# Patient Record
Sex: FEMALE | Race: WHITE | URBAN - METROPOLITAN AREA
[De-identification: names, ages, dates, MRNs, and addresses within clinical notes are randomized per-mention and may not be internally consistent; named-entity substitution may affect disease eponyms.]

---

## 2017-07-29 ENCOUNTER — EMERGENCY (EMERGENCY)
Facility: HOSPITAL | Age: 37
LOS: 1 days | Discharge: HOME | End: 2017-07-29
Admitting: INTERNAL MEDICINE

## 2017-07-29 DIAGNOSIS — S60.414A ABRASION OF RIGHT RING FINGER, INITIAL ENCOUNTER: ICD-10-CM

## 2017-07-29 DIAGNOSIS — S60.412A ABRASION OF RIGHT MIDDLE FINGER, INITIAL ENCOUNTER: ICD-10-CM

## 2017-07-29 DIAGNOSIS — L03.011 CELLULITIS OF RIGHT FINGER: ICD-10-CM
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
## 2017-07-29 DIAGNOSIS — Z90.49 ACQUIRED ABSENCE OF OTHER SPECIFIED PARTS OF DIGESTIVE TRACT: ICD-10-CM

## 2018-11-18 ENCOUNTER — FORM ENCOUNTER (OUTPATIENT)
Age: 38
End: 2018-11-18

## 2019-05-27 ENCOUNTER — TRANSCRIPTION ENCOUNTER (OUTPATIENT)
Age: 39
End: 2019-05-27

## 2019-08-06 ENCOUNTER — OUTPATIENT (OUTPATIENT)
Dept: OUTPATIENT SERVICES | Facility: HOSPITAL | Age: 39
LOS: 1 days | Discharge: HOME | End: 2019-08-06

## 2019-08-06 DIAGNOSIS — F41.8 OTHER SPECIFIED ANXIETY DISORDERS: ICD-10-CM

## 2019-08-06 DIAGNOSIS — R53.82 CHRONIC FATIGUE, UNSPECIFIED: ICD-10-CM

## 2019-08-06 DIAGNOSIS — Z00.01 ENCOUNTER FOR GENERAL ADULT MEDICAL EXAMINATION WITH ABNORMAL FINDINGS: ICD-10-CM

## 2019-10-17 PROBLEM — Z00.00 ENCOUNTER FOR PREVENTIVE HEALTH EXAMINATION: Status: ACTIVE | Noted: 2019-10-17

## 2019-10-23 ENCOUNTER — OTHER (OUTPATIENT)
Age: 39
End: 2019-10-23

## 2019-10-23 ENCOUNTER — APPOINTMENT (OUTPATIENT)
Dept: CARDIOLOGY | Facility: CLINIC | Age: 39
End: 2019-10-23
Payer: COMMERCIAL

## 2019-10-23 VITALS
HEIGHT: 66.5 IN | BODY MASS INDEX: 26.68 KG/M2 | HEART RATE: 93 BPM | WEIGHT: 168 LBS | SYSTOLIC BLOOD PRESSURE: 122 MMHG | DIASTOLIC BLOOD PRESSURE: 78 MMHG

## 2019-10-23 PROCEDURE — 99204 OFFICE O/P NEW MOD 45 MIN: CPT

## 2019-10-23 PROCEDURE — 93000 ELECTROCARDIOGRAM COMPLETE: CPT

## 2019-10-23 RX ORDER — LORAZEPAM 0.5 MG/1
0.5 TABLET ORAL
Refills: 0 | Status: ACTIVE | COMMUNITY

## 2019-10-23 RX ORDER — ESTRADIOL 10 UG/1
TABLET, FILM COATED VAGINAL
Refills: 0 | Status: ACTIVE | COMMUNITY

## 2019-10-23 NOTE — PHYSICAL EXAM
[General Appearance - Well Developed] : well developed [General Appearance - In No Acute Distress] : no acute distress [Normal Conjunctiva] : the conjunctiva exhibited no abnormalities [Eyelids - No Xanthelasma] : the eyelids demonstrated no xanthelasmas [Heart Rate And Rhythm] : heart rate and rhythm were normal [Heart Sounds] : normal S1 and S2 [Murmurs] : no murmurs present [Respiration, Rhythm And Depth] : normal respiratory rhythm and effort [Auscultation Breath Sounds / Voice Sounds] : lungs were clear to auscultation bilaterally [Exaggerated Use Of Accessory Muscles For Inspiration] : no accessory muscle use [Abdomen Soft] : soft [Abdomen Tenderness] : non-tender [Abdomen Mass (___ Cm)] : no abdominal mass palpated [Abnormal Walk] : normal gait [Gait - Sufficient For Exercise Testing] : the gait was sufficient for exercise testing [Nail Clubbing] : no clubbing of the fingernails [Cyanosis, Localized] : no localized cyanosis [Skin Color & Pigmentation] : normal skin color and pigmentation [] : no rash [No Skin Ulcers] : no skin ulcer [FreeTextEntry1] : kike WELDON

## 2019-10-23 NOTE — HISTORY OF PRESENT ILLNESS
[FreeTextEntry1] : 40 yo F with h/o Anxiety c/o frequent palpitations since August. Mainly at rest, at night, not on exertion, improved with deep breathing. Stressed over turning 39 in May. TSH is normal. Takes Ativan as needed. Switched to generic OCP approx. three months ago when symptoms atarted.\par \par FHx: Adopted\par \par

## 2019-11-06 ENCOUNTER — APPOINTMENT (OUTPATIENT)
Dept: CARDIOLOGY | Facility: CLINIC | Age: 39
End: 2019-11-06
Payer: COMMERCIAL

## 2019-11-06 PROCEDURE — 93224 XTRNL ECG REC UP TO 48 HRS: CPT

## 2019-11-07 ENCOUNTER — APPOINTMENT (OUTPATIENT)
Dept: CARDIOLOGY | Facility: CLINIC | Age: 39
End: 2019-11-07
Payer: COMMERCIAL

## 2019-11-07 DIAGNOSIS — R00.2 PALPITATIONS: ICD-10-CM

## 2019-11-07 PROCEDURE — 93306 TTE W/DOPPLER COMPLETE: CPT

## 2019-11-08 PROBLEM — R00.2 PALPITATIONS: Status: ACTIVE | Noted: 2019-10-23

## 2019-11-21 ENCOUNTER — OUTPATIENT (OUTPATIENT)
Dept: OUTPATIENT SERVICES | Facility: HOSPITAL | Age: 39
LOS: 1 days | Discharge: HOME | End: 2019-11-21

## 2019-11-21 ENCOUNTER — APPOINTMENT (OUTPATIENT)
Dept: OBGYN | Facility: CLINIC | Age: 39
End: 2019-11-21
Payer: COMMERCIAL

## 2019-11-21 ENCOUNTER — TRANSCRIPTION ENCOUNTER (OUTPATIENT)
Age: 39
End: 2019-11-21

## 2019-11-21 VITALS
HEIGHT: 66.5 IN | HEART RATE: 89 BPM | DIASTOLIC BLOOD PRESSURE: 87 MMHG | SYSTOLIC BLOOD PRESSURE: 128 MMHG | BODY MASS INDEX: 26.68 KG/M2 | WEIGHT: 168 LBS

## 2019-11-21 DIAGNOSIS — Z87.19 PERSONAL HISTORY OF OTHER DISEASES OF THE DIGESTIVE SYSTEM: ICD-10-CM

## 2019-11-21 DIAGNOSIS — Z78.9 OTHER SPECIFIED HEALTH STATUS: ICD-10-CM

## 2019-11-21 DIAGNOSIS — Z30.41 ENCOUNTER FOR SURVEILLANCE OF CONTRACEPTIVE PILLS: ICD-10-CM

## 2019-11-21 PROCEDURE — 99396 PREV VISIT EST AGE 40-64: CPT

## 2019-11-21 RX ORDER — DROSPIRENONE AND ETHINYL ESTRADIOL 0.02-3(28)
3-0.02 KIT ORAL
Refills: 0 | Status: ACTIVE | COMMUNITY

## 2019-11-21 NOTE — CHIEF COMPLAINT
[Annual Visit] : annual visit [FreeTextEntry1] : Patient presents for annual GYN exam. Last Pap 11/2018 normal. LMP 10/24/2019.  Patient is currently on oral contraception. Patient is due for first mammogram.

## 2019-11-21 NOTE — PHYSICAL EXAM
[Awake] : awake [Alert] : alert [Acute Distress] : no acute distress [Mass] : no breast mass [Nipple Discharge] : no nipple discharge [Soft] : soft [Tender] : non tender [Distended] : not distended [Oriented x3] : oriented to person, place, and time [Normal] : uterus [Labia Majora] : labia major [Labia Minora] : labia minora [Uterine Adnexae] : were not tender and not enlarged

## 2019-11-21 NOTE — DISCUSSION/SUMMARY
[FreeTextEntry1] : Patient here for annual exam\par Doing well on oral contraceptive\par Nikki Loaiza M.D.\par

## 2019-11-21 NOTE — HISTORY OF PRESENT ILLNESS
[Definite:  ___ (Date)] : the last menstrual period was [unfilled] [Menarche Age: ____] : age at menarche was [unfilled] [Sexually Active] : is sexually active [Contraception] : uses contraception [Oral Contraceptives] : uses oral contraceptives [Male ___] : [unfilled] male [No] : no

## 2019-11-22 DIAGNOSIS — Z01.419 ENCOUNTER FOR GYNECOLOGICAL EXAMINATION (GENERAL) (ROUTINE) WITHOUT ABNORMAL FINDINGS: ICD-10-CM

## 2019-11-22 DIAGNOSIS — Z30.9 ENCOUNTER FOR CONTRACEPTIVE MANAGEMENT, UNSPECIFIED: ICD-10-CM

## 2019-11-27 ENCOUNTER — RECORD ABSTRACTING (OUTPATIENT)
Age: 39
End: 2019-11-27

## 2019-11-27 DIAGNOSIS — Z78.9 OTHER SPECIFIED HEALTH STATUS: ICD-10-CM

## 2019-11-27 LAB — CYTOLOGY CVX/VAG DOC THIN PREP: NORMAL

## 2019-12-02 LAB — HPV HIGH+LOW RISK DNA PNL CVX: NOT DETECTED

## 2019-12-04 ENCOUNTER — OUTPATIENT (OUTPATIENT)
Dept: OUTPATIENT SERVICES | Facility: HOSPITAL | Age: 39
LOS: 1 days | Discharge: HOME | End: 2019-12-04
Payer: COMMERCIAL

## 2019-12-04 DIAGNOSIS — Z12.31 ENCOUNTER FOR SCREENING MAMMOGRAM FOR MALIGNANT NEOPLASM OF BREAST: ICD-10-CM

## 2019-12-04 PROCEDURE — 77067 SCR MAMMO BI INCL CAD: CPT | Mod: 26

## 2019-12-04 PROCEDURE — 77063 BREAST TOMOSYNTHESIS BI: CPT | Mod: 26

## 2019-12-05 ENCOUNTER — RESULT REVIEW (OUTPATIENT)
Age: 39
End: 2019-12-05

## 2019-12-17 ENCOUNTER — OUTPATIENT (OUTPATIENT)
Dept: OUTPATIENT SERVICES | Facility: HOSPITAL | Age: 39
LOS: 1 days | Discharge: HOME | End: 2019-12-17
Payer: COMMERCIAL

## 2019-12-17 DIAGNOSIS — R92.8 OTHER ABNORMAL AND INCONCLUSIVE FINDINGS ON DIAGNOSTIC IMAGING OF BREAST: ICD-10-CM

## 2019-12-17 PROCEDURE — G0279: CPT | Mod: 26,LT

## 2019-12-17 PROCEDURE — 77065 DX MAMMO INCL CAD UNI: CPT | Mod: 26,LT

## 2019-12-17 PROCEDURE — 76641 ULTRASOUND BREAST COMPLETE: CPT | Mod: 26,50

## 2019-12-19 ENCOUNTER — OTHER (OUTPATIENT)
Age: 39
End: 2019-12-19

## 2019-12-19 DIAGNOSIS — R92.8 OTHER ABNORMAL AND INCONCLUSIVE FINDINGS ON DIAGNOSTIC IMAGING OF BREAST: ICD-10-CM

## 2019-12-19 DIAGNOSIS — Z87.898 PERSONAL HISTORY OF OTHER SPECIFIED CONDITIONS: ICD-10-CM

## 2020-01-05 ENCOUNTER — TRANSCRIPTION ENCOUNTER (OUTPATIENT)
Age: 40
End: 2020-01-05

## 2020-06-17 ENCOUNTER — RESULT REVIEW (OUTPATIENT)
Age: 40
End: 2020-06-17

## 2020-06-17 ENCOUNTER — OUTPATIENT (OUTPATIENT)
Dept: OUTPATIENT SERVICES | Facility: HOSPITAL | Age: 40
LOS: 1 days | Discharge: HOME | End: 2020-06-17
Payer: COMMERCIAL

## 2020-06-17 DIAGNOSIS — R92.8 OTHER ABNORMAL AND INCONCLUSIVE FINDINGS ON DIAGNOSTIC IMAGING OF BREAST: ICD-10-CM

## 2020-06-17 PROCEDURE — 76642 ULTRASOUND BREAST LIMITED: CPT | Mod: 26,LT

## 2020-08-02 ENCOUNTER — TRANSCRIPTION ENCOUNTER (OUTPATIENT)
Age: 40
End: 2020-08-02

## 2020-09-12 ENCOUNTER — TRANSCRIPTION ENCOUNTER (OUTPATIENT)
Age: 40
End: 2020-09-12

## 2020-12-18 ENCOUNTER — RESULT REVIEW (OUTPATIENT)
Age: 40
End: 2020-12-18

## 2020-12-18 ENCOUNTER — OUTPATIENT (OUTPATIENT)
Dept: OUTPATIENT SERVICES | Facility: HOSPITAL | Age: 40
LOS: 1 days | Discharge: HOME | End: 2020-12-18
Payer: COMMERCIAL

## 2020-12-18 DIAGNOSIS — R92.8 OTHER ABNORMAL AND INCONCLUSIVE FINDINGS ON DIAGNOSTIC IMAGING OF BREAST: ICD-10-CM

## 2020-12-18 PROCEDURE — 77066 DX MAMMO INCL CAD BI: CPT | Mod: 26

## 2020-12-18 PROCEDURE — 76642 ULTRASOUND BREAST LIMITED: CPT | Mod: 26,LT

## 2020-12-18 PROCEDURE — G0279: CPT | Mod: 26

## 2021-01-14 ENCOUNTER — APPOINTMENT (OUTPATIENT)
Dept: OBGYN | Facility: CLINIC | Age: 41
End: 2021-01-14
Payer: COMMERCIAL

## 2021-01-14 VITALS
BODY MASS INDEX: 27.64 KG/M2 | TEMPERATURE: 97.8 F | WEIGHT: 174 LBS | HEART RATE: 71 BPM | DIASTOLIC BLOOD PRESSURE: 70 MMHG | SYSTOLIC BLOOD PRESSURE: 114 MMHG | HEIGHT: 66.5 IN

## 2021-01-14 DIAGNOSIS — Z30.9 ENCOUNTER FOR CONTRACEPTIVE MANAGEMENT, UNSPECIFIED: ICD-10-CM

## 2021-01-14 DIAGNOSIS — Z01.419 ENCOUNTER FOR GYNECOLOGICAL EXAMINATION (GENERAL) (ROUTINE) W/OUT ABNORMAL FINDINGS: ICD-10-CM

## 2021-01-14 LAB
HCG UR QL: NEGATIVE
QUALITY CONTROL: YES

## 2021-01-14 PROCEDURE — 99396 PREV VISIT EST AGE 40-64: CPT | Mod: 25

## 2021-01-14 PROCEDURE — 81025 URINE PREGNANCY TEST: CPT

## 2021-01-14 PROCEDURE — 99072 ADDL SUPL MATRL&STAF TM PHE: CPT

## 2021-01-14 RX ORDER — DROSPIRENONE AND ETHINYL ESTRADIOL 0.02-3(28)
3-0.02 KIT ORAL DAILY
Qty: 3 | Refills: 3 | Status: ACTIVE | COMMUNITY
Start: 2019-11-21 | End: 1900-01-01

## 2021-01-14 NOTE — HISTORY OF PRESENT ILLNESS
[Menarche Age: ____] : age at menarche was [unfilled] [No] : Patient does not have concerns regarding sex [Currently Active] : currently active [Men] : men [FreeTextEntry1] : 01-

## 2021-01-14 NOTE — PHYSICAL EXAM
[Appropriately responsive] : appropriately responsive [Alert] : alert [No Acute Distress] : no acute distress [No Lymphadenopathy] : no lymphadenopathy [No Murmurs] : no murmurs [Soft] : soft [Non-tender] : non-tender [Non-distended] : non-distended [No Lesions] : no lesions [No HSM] : No HSM [No Mass] : no mass [Oriented x3] : oriented x3 [Examination Of The Breasts] : a normal appearance [No Masses] : no breast masses were palpable [Labia Majora] : normal [Labia Minora] : normal [Normal] : normal [Uterine Adnexae] : normal

## 2021-01-20 LAB
CYTOLOGY CVX/VAG DOC THIN PREP: NORMAL
HPV HIGH+LOW RISK DNA PNL CVX: NOT DETECTED

## 2021-03-18 ENCOUNTER — APPOINTMENT (OUTPATIENT)
Dept: BREAST CENTER | Facility: CLINIC | Age: 41
End: 2021-03-18
Payer: COMMERCIAL

## 2021-03-18 VITALS
HEIGHT: 66 IN | TEMPERATURE: 98.2 F | SYSTOLIC BLOOD PRESSURE: 126 MMHG | BODY MASS INDEX: 27.64 KG/M2 | WEIGHT: 172 LBS | DIASTOLIC BLOOD PRESSURE: 80 MMHG

## 2021-03-18 DIAGNOSIS — Z87.891 PERSONAL HISTORY OF NICOTINE DEPENDENCE: ICD-10-CM

## 2021-03-18 PROCEDURE — 99072 ADDL SUPL MATRL&STAF TM PHE: CPT

## 2021-03-18 PROCEDURE — 99203 OFFICE O/P NEW LOW 30 MIN: CPT

## 2021-03-18 NOTE — REVIEW OF SYSTEMS
[As Noted in HPI] : as noted in HPI [Breast Pain] : breast pain [Negative] : Heme/Lymph [Abn Vaginal Bleeding] : no unexplained vaginal bleeding [Skin Lesions] : no skin lesions [Skin Wound] : no skin wound [Breast Lump] : no breast lump

## 2021-03-18 NOTE — PHYSICAL EXAM
[Normocephalic] : normocephalic [Atraumatic] : atraumatic [EOMI] : extra ocular movement intact [No Supraclavicular Adenopathy] : no supraclavicular adenopathy [No Cervical Adenopathy] : no cervical adenopathy [No dominant masses] : no dominant masses in right breast  [No dominant masses] : no dominant masses left breast [No Nipple Retraction] : no left nipple retraction [No Nipple Discharge] : no left nipple discharge [No Axillary Lymphadenopathy] : no left axillary lymphadenopathy [Soft] : abdomen soft [Not Tender] : non-tender [No Edema] : no edema [No Rashes] : no rashes [No Ulceration] : no ulceration [Examined in the supine and seated position] : examined in the supine and seated position [de-identified] : multiple bilateral nondiscrete nodularities palpated throughout both breasts, but no suspicious or discrete mass

## 2021-03-18 NOTE — ASSESSMENT
[FreeTextEntry1] : Eladio is a 40F with  L breast BIRADS 3 abnormality. \par \par On exam, she had b/l nondiscrete nodularities but no suspicious masses or abnormality was palpated within either breast. \par \par Her most recent imaging was a b/l screening mammogram and L breast US which revealed a stable L breast mass @11-12N2, measurig 1.2 x 1.7 x 0.9 cm, deemed BIRADS 3.  She had another mass in her left breast, measuring 0.3 cm, however this was not visualized on this US. \par \par She will be due for a L dx mammogram and US On 6/18/2021.  This will be scheduled for her today.  I will have her follow up after for a CBE. \par \par We discussed BIRADS 3 lesions.  These lesions have a 2% chance of harboring malignancy.  There is always an option to obtain a tissue sample with a biopsy to confirm the diagnosis.   The procedure was described in detail including the placement of a tissue marker clip.  The risks of the procedure, including but not limited to bleeding and infection were also explained.  She is not interested in biopsy at this time and agrees to continue with short-term interval imaging.\par \par We discussed dense breasts.  Increasing breast density has been found to increase ones risk of breast cancer, but at this time, there is no clear indication for additional imaging in this setting, as both US and MRI have not been found to improve survival.  One can consider bilateral screening US.  However, out of 1000 women screened, the use of routine US will only identify an additional 3-5 cancers.  The use of US was found to increase the likelihood of undergoing more imaging and more biopsies.  She does have dense breasts.  We have decided to proceed with screening bilateral breast US at this time.  This will be scheduled with her next screening mammogram.\par \par She is otherwise at an average risk for breast cancer and should continue with annual screening mammograms/US. \par \par All of her questions were answered.  She knows to call with any further questions or concerns. \par \par \par PLAN: \par -L dx mammogram and US on 6/18/2021 \par -f/up after

## 2021-03-18 NOTE — HISTORY OF PRESENT ILLNESS
[FreeTextEntry1] : Kaitlin is a 40 F with L breast BIRADS 3 abnormality. \par \par She has bilateral cyclical breast pain, but has not palpated any abnormal masses within either breast and denies any nipple discharge or retraction.  Her left breast is larger than her right and has been that way since her third child. \par \par Her work up was as follows: \par 2020 -- b/l screening mammogram and L breast US \par -heterogenously dense breasts\par -re-demonstration of unchanged L breast mass \par -no suspicious microcalcifications or architectural distortion in L \par -no suspicious mass, microcalcifications or architectural distortion in R \par L US \par -@11-12N2, 1.2 x 1.7 x 0.9 cm circumscribed hypoechoic mass, no change \par -previously described less distinct mass measuring 0.3 cm is not visualized on current exam\par BIRADS 3 \par \par HISTORICAL RISK FACTORS: \par -no prior breast biopsies or surgeries \par -no family history of breast or ovarian cancer \par -, age at first live birth was 24 \par -current OCP use x 7 years\par -no gyn surgeries\par

## 2021-03-18 NOTE — PAST MEDICAL HISTORY
[Menstruating] : The patient is menstruating [Menarche Age ____] : age at menarche was [unfilled] [Total Preg ___] : G[unfilled] [Live Births ___] : P[unfilled]  [Age At Live Birth ___] : Age at live birth: [unfilled] [History of Hormone Replacement Treatment] : has no history of hormone replacement treatment [FreeTextEntry5] : denies [FreeTextEntry6] : denies [FreeTextEntry7] : yes in past  [FreeTextEntry8] : denies

## 2021-03-18 NOTE — DATA REVIEWED
[FreeTextEntry1] : EXAM: MG US BREAST LIMITED LT\par EXAM: MG MAMMO DIAG W TATI BI#\par \par \par PROCEDURE DATE: 12/18/2020\par \par \par \par INTERPRETATION: Patient presents for follow-up of masses within the left breast since 12/17/2019. Screening right breast.\par \par The patient reports her last clinical breast examination was performed 11 months ago.\par \par CC and MLO views of the bilateral breasts in addition to spot compression views of the right breast in CC and MLO projections were obtained. 3D tomosynthesis images were obtained as well.\par \par Computer-aided detection was utilized in the interpretation of this examination.\par \par Comparison is made to the prior examinations dating back to 12/4/2019.\par \par Breast composition: The breasts are heterogeneously dense, which may obscure small masses.\par \par There is redemonstration of unchanged left breast masses. Short interval follow-up recommended. No new suspicious calcification or architectural distortion in the left breast.\par \par There is no suspicious mass, calcification or architectural distortion in the right breast.\par \par Targeted left breast ultrasound:\par \par At the 11-12 o'clock position 2 cm from nipple, 1.2 x 1.7 x 0.9 cm circumscribed hypoechoic mass is without significant change. Short interval follow-up recommended. A previously described less distinct 0.3 cm mass in the same area is not visualized on the current examination likely in the basal for fluctuating cyst.\par \par Impression:\par 1. Stable mass left breast, probably benign. Short interval follow-up recommended.\par \par 2. No mammographic malignancy in the right breast, continue annual screening right breast.\par \par Recommendation: Follow-up unilateral diagnostic mammogram and ultrasound in 6 months.\par \par BI-RADS category 3: Probably Benign\par \par \par \par \par ANGIE KRAUSE M.D., RESIDENT RADIOLOGIST\par This document has been electronically signed.\par SAADIA LUGO DO; Attending Radiologist\par This document has been electronically signed. Dec 18 2020 11:46AM\par \par

## 2021-06-22 ENCOUNTER — RESULT REVIEW (OUTPATIENT)
Age: 41
End: 2021-06-22

## 2021-06-22 ENCOUNTER — NON-APPOINTMENT (OUTPATIENT)
Age: 41
End: 2021-06-22

## 2021-06-22 ENCOUNTER — OUTPATIENT (OUTPATIENT)
Dept: OUTPATIENT SERVICES | Facility: HOSPITAL | Age: 41
LOS: 1 days | Discharge: HOME | End: 2021-06-22
Payer: COMMERCIAL

## 2021-06-22 DIAGNOSIS — R92.8 OTHER ABNORMAL AND INCONCLUSIVE FINDINGS ON DIAGNOSTIC IMAGING OF BREAST: ICD-10-CM

## 2021-06-22 PROCEDURE — 76642 ULTRASOUND BREAST LIMITED: CPT | Mod: 26,LT

## 2021-06-22 PROCEDURE — 77065 DX MAMMO INCL CAD UNI: CPT | Mod: 26,LT

## 2021-06-22 PROCEDURE — G0279: CPT | Mod: 26

## 2021-06-23 ENCOUNTER — NON-APPOINTMENT (OUTPATIENT)
Age: 41
End: 2021-06-23

## 2021-06-30 ENCOUNTER — APPOINTMENT (OUTPATIENT)
Dept: BREAST CENTER | Facility: CLINIC | Age: 41
End: 2021-06-30
Payer: COMMERCIAL

## 2021-06-30 VITALS
TEMPERATURE: 98.7 F | SYSTOLIC BLOOD PRESSURE: 122 MMHG | DIASTOLIC BLOOD PRESSURE: 78 MMHG | WEIGHT: 170 LBS | HEIGHT: 66 IN | BODY MASS INDEX: 27.32 KG/M2

## 2021-06-30 PROCEDURE — 99213 OFFICE O/P EST LOW 20 MIN: CPT

## 2021-06-30 PROCEDURE — 99072 ADDL SUPL MATRL&STAF TM PHE: CPT

## 2021-07-03 NOTE — DATA REVIEWED
[FreeTextEntry1] : EXAM:  MG US BREAST LIMITED LT\par EXAM:  MG MAMMO DIAG W TATI LT#\par \par \par PROCEDURE DATE:  06/22/2021\par \par \par \par INTERPRETATION:  CLINICAL HISTORY: Short interval follow-up for probably benign left breast masses, first identified in December, 2019.\par \par The patient reports her last clinical breast examination was performed 3 months ago.\par \par FAMILY HISTORY: Unknown.\par \par COMPARISONS: Mammograms/ultrasounds dating back to 2019.\par \par BREAST COMPOSITION: The breasts are heterogeneously dense, which may obscure small masses.\par \par VIEWS: 2-D/tomosynthesis CC/MLO views of the left breast, spot view of the left breast. Computer-aided detection was utilized by the radiologists in the interpretation of this examination.\par \par \par FINDINGS:\par \par MAMMOGRAM:\par Stable circumscribed masses in the left breast are unchanged from 12/4/2019 and likely benign. Stable asymmetry is seen in the left lateral breast. No suspicious masses, calcifications, or areas of architectural distortion are seen. There has been no significant interval change from the prior study.\par \par ULTRASOUND:\par Targeted left breast ultrasound was performed.\par \par Stable, circumscribed, hypoechoic mass in the left breast, 11-12 o'clock axis, 2 cm the nipple measures up to 1.7 cm, stable from 12/17/2019 and likely benign. Continued follow-up is recommended to demonstrate stability. No suspicious masses are seen in the left lateral breast.\par \par \par IMPRESSION:\par \par Stable probably benign left breast mass as above.\par \par Recommendation: Follow-up bilateral diagnostic mammogram and ultrasound in 6 months.\par \par BI-RADS category 3: Probably Benign\par \par Findings and recommendations were discussed with the patient.\par \par \par \par \par \par ZAY MELENDEZ MD; Attending Radiologist\par This document has been electronically signed. Jun 22 2021  1:01PM\par \par  \par

## 2021-07-03 NOTE — HISTORY OF PRESENT ILLNESS
[FreeTextEntry1] : Kaitlin is a 41 F with L breast BIRADS 3 abnormality.\par \par She has bilateral cyclical breast pain, but has not palpated any abnormal masses within either breast and denies any nipple discharge or retraction.  Her left breast is larger than her right and has been that way since her third child. \par \par Her work up was as follows: \par 2020 -- b/l screening mammogram and L breast US \par -heterogenously dense breasts\par -re-demonstration of unchanged L breast mass \par -no suspicious microcalcifications or architectural distortion in L \par -no suspicious mass, microcalcifications or architectural distortion in R \par L US \par -@11-12N2, 1.2 x 1.7 x 0.9 cm circumscribed hypoechoic mass, no change \par -previously described less distinct mass measuring 0.3 cm is not visualized on current exam\par BIRADS 3 \par \par HISTORICAL RISK FACTORS: \par -no prior breast biopsies or surgeries \par -no family history of breast or ovarian cancer \par -, age at first live birth was 24 \par -current OCP use x 7 years\par -no gyn surgeries\par \par INTERVAL HISTORY: \par Kaitlin returns for a short term follow up for a L breast BIRADS 3 mass. \par \par She has no breast related complaints at this time.  She denies any breast pain, has not palpated any new palpable masses in either breast and denies any nipple discharge or retraction. \par \par Her most recent imaging was performed on 2021, a L dx mammogram and US, which revealed a stable L breast mass @11-12N2, measuring 1.7 cm, stable left lateral breast asymmetry, and otherwise unrevealing, deemed BIRADS 3.

## 2021-07-03 NOTE — PHYSICAL EXAM
[Normocephalic] : normocephalic [Atraumatic] : atraumatic [EOMI] : extra ocular movement intact [No Supraclavicular Adenopathy] : no supraclavicular adenopathy [No Cervical Adenopathy] : no cervical adenopathy [Examined in the supine and seated position] : examined in the supine and seated position [No dominant masses] : no dominant masses in right breast  [No dominant masses] : no dominant masses left breast [No Nipple Retraction] : no left nipple retraction [No Nipple Discharge] : no left nipple discharge [No Axillary Lymphadenopathy] : no left axillary lymphadenopathy [Soft] : abdomen soft [Not Tender] : non-tender [No Edema] : no edema [No Rashes] : no rashes [No Ulceration] : no ulceration [de-identified] : multiple bilateral nondiscrete nodularities palpated throughout both breasts, but no suspicious or discrete mass

## 2021-07-03 NOTE — ASSESSMENT
[FreeTextEntry1] : Eladio is a 41F with  L breast BIRADS 3 abnormality. \par \par On exam, she had b/l nondiscrete nodularities but no suspicious masses or abnormality was palpated within either breast. \par \par Her most recent imaging was performed on 6/30/2021, a L dx mammogram and US, which revealed a stable L breast mass @11-12N2, measuring 1.7 cm, stable left lateral breast asymmetry, and otherwise unrevealing, deemed BIRADS 3. \par \par She will be due for a b/l dx mammogram and US On 12/18/2021.  This will be scheduled for her today.  I will have her follow up after for a CBE. \par \par We discussed BIRADS 3 lesions.  These lesions have a 2% chance of harboring malignancy.  There is always an option to obtain a tissue sample with a biopsy to confirm the diagnosis.   The procedure was described in detail including the placement of a tissue marker clip.  The risks of the procedure, including but not limited to bleeding and infection were also explained.  She is not interested in biopsy at this time and agrees to continue with short-term interval imaging.\par \par We discussed dense breasts.  Increasing breast density has been found to increase ones risk of breast cancer, but at this time, there is no clear indication for additional imaging in this setting, as both US and MRI have not been found to improve survival.  One can consider bilateral screening US.  However, out of 1000 women screened, the use of routine US will only identify an additional 3-5 cancers.  The use of US was found to increase the likelihood of undergoing more imaging and more biopsies.  She does have dense breasts.  We have decided to proceed with screening bilateral breast US at this time.  This will be scheduled with her next screening mammogram.\par \par She is otherwise at an average risk for breast cancer and should continue with annual screening mammograms/US. \par \par All of her questions were answered.  She knows to call with any further questions or concerns. \par \par \par PLAN: \par -b/l dx mammogram and US on 12/18/2021 \par -f/up after

## 2021-07-03 NOTE — PAST MEDICAL HISTORY
[Menstruating] : The patient is menstruating [Menarche Age ____] : age at menarche was [unfilled] [Total Preg ___] : G[unfilled] [Live Births ___] : P[unfilled]  [Age At Live Birth ___] : Age at live birth: [unfilled] [History of Hormone Replacement Treatment] : has no history of hormone replacement treatment [FreeTextEntry6] : denies [FreeTextEntry5] : denies [FreeTextEntry8] : denies  [FreeTextEntry7] : yes in past

## 2021-12-05 ENCOUNTER — TRANSCRIPTION ENCOUNTER (OUTPATIENT)
Age: 41
End: 2021-12-05

## 2021-12-22 ENCOUNTER — RESULT REVIEW (OUTPATIENT)
Age: 41
End: 2021-12-22

## 2021-12-22 ENCOUNTER — OUTPATIENT (OUTPATIENT)
Dept: OUTPATIENT SERVICES | Facility: HOSPITAL | Age: 41
LOS: 1 days | Discharge: HOME | End: 2021-12-22
Payer: COMMERCIAL

## 2021-12-22 DIAGNOSIS — R92.8 OTHER ABNORMAL AND INCONCLUSIVE FINDINGS ON DIAGNOSTIC IMAGING OF BREAST: ICD-10-CM

## 2021-12-22 PROCEDURE — 77066 DX MAMMO INCL CAD BI: CPT | Mod: 26

## 2021-12-22 PROCEDURE — 76641 ULTRASOUND BREAST COMPLETE: CPT | Mod: 26,50

## 2021-12-22 PROCEDURE — G0279: CPT | Mod: 26

## 2021-12-23 PROBLEM — R92.8 ABNORMAL FINDING ON BREAST IMAGING: Status: ACTIVE | Noted: 2021-03-18

## 2021-12-28 ENCOUNTER — APPOINTMENT (OUTPATIENT)
Dept: BREAST CENTER | Facility: CLINIC | Age: 41
End: 2021-12-28
Payer: COMMERCIAL

## 2021-12-28 VITALS
BODY MASS INDEX: 27.32 KG/M2 | WEIGHT: 170 LBS | SYSTOLIC BLOOD PRESSURE: 110 MMHG | DIASTOLIC BLOOD PRESSURE: 72 MMHG | HEIGHT: 66 IN | TEMPERATURE: 98.2 F

## 2021-12-28 DIAGNOSIS — R92.8 OTHER ABNORMAL AND INCONCLUSIVE FINDINGS ON DIAGNOSTIC IMAGING OF BREAST: ICD-10-CM

## 2021-12-28 PROCEDURE — 99212 OFFICE O/P EST SF 10 MIN: CPT

## 2021-12-28 NOTE — PHYSICAL EXAM
[Normocephalic] : normocephalic [Atraumatic] : atraumatic [EOMI] : extra ocular movement intact [Examined in the supine and seated position] : examined in the supine and seated position [Symmetrical] : symmetrical [No dominant masses] : no dominant masses in right breast  [No dominant masses] : no dominant masses left breast [No Nipple Retraction] : no left nipple retraction [No Nipple Discharge] : no left nipple discharge [No Axillary Lymphadenopathy] : no left axillary lymphadenopathy [No Edema] : no edema [No Rashes] : no rashes [No Ulceration] : no ulceration [de-identified] : On physical exam she  had b/l nondiscrete nodularities but no suspicious masses or abnormality was palpated within either breast. \par \par

## 2021-12-28 NOTE — DATA REVIEWED
[FreeTextEntry1] : EXAM:  MG US BREAST COMPLETE BI\par EXAM:  MG MAMMO DIAG W TATI BI#\par \par \par PROCEDURE DATE:  12/22/2021\par \par \par \par INTERPRETATION:  Clinical History / Reason for exam: Short interval follow-up of probably benign left breast masses, first identified in December 2019.\par \par The patient reports her last clinical breast examination was performed 6 months ago.\par \par Family history: None\par \par Comparisons: Priors dating back to 2019.\par \par Views obtained:Bilateral full field CC and MLO views with tomosynthesis. Spot compression views of the left breast with tomosynthesis.\par \par Computer-aided detection was utilized in the interpretation of this examination.\par \par Breast composition:The breasts are heterogeneously dense, which may obscure small masses.\par \par Findings:\par \par Mammogram:\par \par Circumscribed masses and lateral asymmetry noted in the left breast have demonstrated long-term stability and are therefore benign in etiology. No new suspicious mass, microcalcifications or areas of architectural distortion is seen either breast. When compared to the previous examinations there is no significant interval change and nothing to suggest malignancy.\par \par Ultrasound:\par \par Bilateral whole breast ultrasound was performed as part of a dense breast screening.\par \par Right breast:\par Scattered subcentimeter benign cysts. No suspicious solid or cystic masses. No axillary adenopathy.\par \par Left breast:\par At the 11 to 12:00 position 2 cm from the nipple, there is a stable hypoechoic mass measuring 1.2 x 1.7 x 0.8 cm, benign given its long-term stability. There is an adjacent stable hypoechoic mass measuring 0.4 x 0.4 x 0.2 cm, benign even its long-term stability. No additional solid or cystic masses. No axillary adenopathy.\par \par Impression: No mammographic or sonographic evidence of malignancy. Stable left breast mass and asymmetry is benign given their long-term stability.\par \par Recommendation: Unless otherwise indicated by clinical findings, annual screening mammography recommended.\par \par BI-RADS Category 2: Benign\par \par

## 2021-12-28 NOTE — ASSESSMENT
[FreeTextEntry1] : Eladio is a 41F with  L breast BIRADS 3 abnormality, now deemed BIRADS2, left breast mass and asymmetry is benign given their long-term stability.\par On exam, she had b/l nondiscrete nodularities but no suspicious masses or abnormality was palpated within either breast. \par \par Her imaging is as follows:\par 12/22/2021 b/l mammo and US\par -breasts are heterogeneously dense\par -Circumscribed masses and lateral asymmetry noted in the left breast have demonstrated long-term stability and are therefore benign in etiology.\par \par LEFT US:\par -@11 to 12N 2 stable hypoechoic mass measuring 1.2 x 1.7 x 0.8 cm, benign given its long-term stability.\par -adjacent stable hypoechoic mass measuring 0.4 x 0.4 x 0.2 cm, benign even its long-term stability.\par BI-RADS 2\par \par She will be due for a b/l dx mammogram and US On 12/23/2022.  This will be scheduled for her today.  I will have her follow up after for a CBE. \par \par \par AS REVIEW:\par We discussed BIRADS 3 lesions.  These lesions have a 2% chance of harboring malignancy.  There is always an option to obtain a tissue sample with a biopsy to confirm the diagnosis.   The procedure was described in detail including the placement of a tissue marker clip.  The risks of the procedure, including but not limited to bleeding and infection were also explained.  She is not interested in biopsy at this time and agrees to continue with short-term interval imaging.\par \par We discussed dense breasts.  Increasing breast density has been found to increase ones risk of breast cancer, but at this time, there is no clear indication for additional imaging in this setting, as both US and MRI have not been found to improve survival.  One can consider bilateral screening US.  However, out of 1000 women screened, the use of routine US will only identify an additional 3-5 cancers.  The use of US was found to increase the likelihood of undergoing more imaging and more biopsies.  She does have dense breasts.  We have decided to proceed with screening bilateral breast US at this time.  This will be scheduled with her next screening mammogram.\par \par She is otherwise at an average risk for breast cancer and should continue with annual screening mammograms/US. \par \par All of her questions were answered.  She knows to call with any further questions or concerns. \par \par \par PLAN: \par -b/l  mammogram and US on 12/23/22\par -f/up after (if unrevealing mammo and US and no complaints she will be prn after this visit)

## 2021-12-28 NOTE — HISTORY OF PRESENT ILLNESS
[FreeTextEntry1] : Kaitlin is a 41 F with L breast BIRADS 3 abnormality.\par \par She has bilateral cyclical breast pain, but has not palpated any abnormal masses within either breast and denies any nipple discharge or retraction.  Her left breast is larger than her right and has been that way since her third child. \par \par Her work up was as follows: \par 2020 -- b/l screening mammogram and L breast US \par -heterogenously dense breasts\par -re-demonstration of unchanged L breast mass \par -no suspicious microcalcifications or architectural distortion in L \par -no suspicious mass, microcalcifications or architectural distortion in R \par L US \par -@11-12N2, 1.2 x 1.7 x 0.9 cm circumscribed hypoechoic mass, no change \par -previously described less distinct mass measuring 0.3 cm is not visualized on current exam\par BIRADS 3 \par \par HISTORICAL RISK FACTORS: \par -no prior breast biopsies or surgeries \par -no family history of breast or ovarian cancer \par -, age at first live birth was 24 \par -current OCP use x 7 years\par -no gyn surgeries\par \par INTERVAL HISTORY: \par Kaitlin returns for a short term follow up for a L breast BIRADS 3 mass. \par \par She has no breast related complaints at this time.  She denies any breast pain, has not palpated any new palpable masses in either breast and denies any nipple discharge or retraction. \par \par Her most recent imaging was performed on 2021, a L dx mammogram and US, which revealed a stable L breast mass @11-12N2, measuring 1.7 cm, stable left lateral breast asymmetry, and otherwise unrevealing, deemed BIRADS 3. \par \par INTERVAL HISTORY: 21\par Kaitlin returns for a short term follow up for a L breast BIRADS 3 mass now deemed as BIRADS2\par \par She has no breast related complaints at this time.  She denies any breast pain, has not palpated any new palpable masses in either breast and denies any nipple discharge or retraction. \par \par Her imaging is as follows:\par 2021 b/l mammo and US\par -breasts are heterogeneously dense\par -Circumscribed masses and lateral asymmetry noted in the left breast have demonstrated long-term stability and are therefore benign in etiology.\par \par LEFT US:\par -@11 to 12N 2 stable hypoechoic mass measuring 1.2 x 1.7 x 0.8 cm, benign given its long-term stability.\par -adjacent stable hypoechoic mass measuring 0.4 x 0.4 x 0.2 cm, benign even its long-term stability.\par BI-RADS 2

## 2022-01-21 ENCOUNTER — RX RENEWAL (OUTPATIENT)
Age: 42
End: 2022-01-21

## 2022-01-21 RX ORDER — DROSPIRENONE AND ETHINYL ESTRADIOL 0.02-3(28)
3-0.02 KIT ORAL
Qty: 84 | Refills: 0 | Status: ACTIVE | COMMUNITY
Start: 2022-01-21 | End: 1900-01-01

## 2022-02-01 ENCOUNTER — RX RENEWAL (OUTPATIENT)
Age: 42
End: 2022-02-01

## 2023-01-03 ENCOUNTER — RESULT REVIEW (OUTPATIENT)
Age: 43
End: 2023-01-03

## 2023-01-03 ENCOUNTER — OUTPATIENT (OUTPATIENT)
Dept: OUTPATIENT SERVICES | Facility: HOSPITAL | Age: 43
LOS: 1 days | Discharge: HOME | End: 2023-01-03
Payer: COMMERCIAL

## 2023-01-03 DIAGNOSIS — R92.2 INCONCLUSIVE MAMMOGRAM: ICD-10-CM

## 2023-01-03 DIAGNOSIS — Z12.31 ENCOUNTER FOR SCREENING MAMMOGRAM FOR MALIGNANT NEOPLASM OF BREAST: ICD-10-CM

## 2023-01-03 PROCEDURE — 77067 SCR MAMMO BI INCL CAD: CPT | Mod: 26

## 2023-01-03 PROCEDURE — 77063 BREAST TOMOSYNTHESIS BI: CPT | Mod: 26

## 2023-01-03 PROCEDURE — 76641 ULTRASOUND BREAST COMPLETE: CPT | Mod: 26,50

## 2023-01-10 ENCOUNTER — APPOINTMENT (OUTPATIENT)
Dept: BREAST CENTER | Facility: CLINIC | Age: 43
End: 2023-01-10
Payer: COMMERCIAL

## 2023-02-02 ENCOUNTER — RESULT REVIEW (OUTPATIENT)
Age: 43
End: 2023-02-02

## 2023-02-02 ENCOUNTER — OUTPATIENT (OUTPATIENT)
Dept: OUTPATIENT SERVICES | Facility: HOSPITAL | Age: 43
LOS: 1 days | Discharge: HOME | End: 2023-02-02
Payer: COMMERCIAL

## 2023-02-02 DIAGNOSIS — R92.8 OTHER ABNORMAL AND INCONCLUSIVE FINDINGS ON DIAGNOSTIC IMAGING OF BREAST: ICD-10-CM

## 2023-02-02 PROCEDURE — G0279: CPT | Mod: 26

## 2023-02-02 PROCEDURE — 77065 DX MAMMO INCL CAD UNI: CPT | Mod: 26,RT

## 2023-02-02 PROCEDURE — 76642 ULTRASOUND BREAST LIMITED: CPT | Mod: 26,RT

## 2023-02-06 ENCOUNTER — RESULT REVIEW (OUTPATIENT)
Age: 43
End: 2023-02-06

## 2023-02-06 ENCOUNTER — OUTPATIENT (OUTPATIENT)
Dept: OUTPATIENT SERVICES | Facility: HOSPITAL | Age: 43
LOS: 1 days | End: 2023-02-06
Payer: COMMERCIAL

## 2023-02-06 DIAGNOSIS — N63.10 UNSPECIFIED LUMP IN THE RIGHT BREAST, UNSPECIFIED QUADRANT: ICD-10-CM

## 2023-02-06 DIAGNOSIS — Z00.8 ENCOUNTER FOR OTHER GENERAL EXAMINATION: ICD-10-CM

## 2023-02-06 PROCEDURE — 88364 INSITU HYBRIDIZATION (FISH): CPT | Mod: 26

## 2023-02-06 PROCEDURE — 88342 IMHCHEM/IMCYTCHM 1ST ANTB: CPT

## 2023-02-06 PROCEDURE — 88305 TISSUE EXAM BY PATHOLOGIST: CPT | Mod: 26

## 2023-02-06 PROCEDURE — 88365 INSITU HYBRIDIZATION (FISH): CPT

## 2023-02-06 PROCEDURE — 19083 BX BREAST 1ST LESION US IMAG: CPT | Mod: RT

## 2023-02-06 PROCEDURE — 88341 IMHCHEM/IMCYTCHM EA ADD ANTB: CPT | Mod: 26,59

## 2023-02-06 PROCEDURE — 19084 BX BREAST ADD LESION US IMAG: CPT | Mod: RT

## 2023-02-06 PROCEDURE — 77065 DX MAMMO INCL CAD UNI: CPT | Mod: RT

## 2023-02-06 PROCEDURE — 88365 INSITU HYBRIDIZATION (FISH): CPT | Mod: 26

## 2023-02-06 PROCEDURE — A4648: CPT

## 2023-02-06 PROCEDURE — 88342 IMHCHEM/IMCYTCHM 1ST ANTB: CPT | Mod: 26,59

## 2023-02-06 PROCEDURE — 88341 IMHCHEM/IMCYTCHM EA ADD ANTB: CPT

## 2023-02-06 PROCEDURE — 88360 TUMOR IMMUNOHISTOCHEM/MANUAL: CPT | Mod: 26

## 2023-02-06 PROCEDURE — 88305 TISSUE EXAM BY PATHOLOGIST: CPT

## 2023-02-06 PROCEDURE — 88364 INSITU HYBRIDIZATION (FISH): CPT

## 2023-02-06 PROCEDURE — 77065 DX MAMMO INCL CAD UNI: CPT | Mod: 26,RT

## 2023-02-06 PROCEDURE — 88360 TUMOR IMMUNOHISTOCHEM/MANUAL: CPT

## 2023-02-07 LAB — SURGICAL PATHOLOGY STUDY: SIGNIFICANT CHANGE UP

## 2023-02-14 ENCOUNTER — APPOINTMENT (OUTPATIENT)
Dept: BREAST CENTER | Facility: CLINIC | Age: 43
End: 2023-02-14
Payer: COMMERCIAL

## 2023-02-14 VITALS
WEIGHT: 170 LBS | DIASTOLIC BLOOD PRESSURE: 68 MMHG | BODY MASS INDEX: 27.32 KG/M2 | SYSTOLIC BLOOD PRESSURE: 118 MMHG | HEIGHT: 66 IN

## 2023-02-14 DIAGNOSIS — R92.2 INCONCLUSIVE MAMMOGRAM: ICD-10-CM

## 2023-02-14 DIAGNOSIS — N64.89 OTHER SPECIFIED DISORDERS OF BREAST: ICD-10-CM

## 2023-02-14 PROCEDURE — 99215 OFFICE O/P EST HI 40 MIN: CPT

## 2023-02-14 NOTE — PAST MEDICAL HISTORY
[History of Hormone Replacement Treatment] : has no history of hormone replacement treatment [FreeTextEntry5] : denies [FreeTextEntry6] : denies [FreeTextEntry7] : yes in past  [FreeTextEntry8] : denies

## 2023-02-14 NOTE — ASSESSMENT
[FreeTextEntry1] : Patient is a 42F with right breast radial scar.  She underwent screening mmg/us in 1/3/2023 which showed the stable left masses, in addition to an architectural distortion in the right breast UOQ (BIRADS 0) with recommendation for additional mammographic views and US.  She had a right mmg/us which showed a 9N3-4 0.6cm irregular mass biopsied to be a radial scar (stoplight) and a 26N07-31 node with cortical thickening biopsied to be node fragment s with dermatopathic lymphadenitis, consistent with follicular lymphoid hyperplasia (tophat).  I had a long discussion with the patient. I reviewed the imaging and pathology findings. I explained a radial scar to the patient. I explained that the upgrade rate to carcinoma on excision is approximately 8%. I explained if upgraded, the majority of cases are in situ disease. Given the upgrade rate, excisional biopsy should be considered for most complex sclerosing lesions or radial scars. However, if the lesion is small and adequately sampled in the setting of pathology-imaging concordance, then observation can be considered.  I reviewed excisional biopsy procedure with the patient. The risks of excision include bleeding, infection, fluid collection, pain, numbness of the areas of the skin, surgical deformity of the breast or nipple-areolar complex, sensitivity of the breast, wound healing problems, discoloration of the wound edges, possible keloid formation and additional surgery, amongst other risks. I explained to the patient that if the surgical excision demonstrates cancer, the patient could require additional surgery, including re-excision for clear margins, or more extensive surgery, including sentinel lymph node biopsy. It is also possible to miss a lesion or have technical issues with the procedure, particularly those involving clips and wires. These could necessitate additional imaging studies or surgeries.  Patient understands and wishes to proceed with right breast excisional biopsy.  We also discussed that her axillary node was biopsied to be follicular lymphoid hyperplasia. She will be referred to heme-onc or follow up with her PCP.  All questions and concerns were answered in detail.  Patient for right breast excision.  She wishes to follow up with her PCP for follicular hyperplasia  Total time spent on encounter was greater than 45 minutes, which included face to face time with the patient, performing an exam, reviewing previous medical records including imaging/ pathology, documenting in patient record and coordinating care/imaging. Greater than 50% of the encounter was spent on counseling and coordination of her breast issue.

## 2023-02-14 NOTE — HISTORY OF PRESENT ILLNESS
[FreeTextEntry1] : Patient is a 42F with BIRADS 3, now deemed BIRADS 2, imaging.\par \par HISTORICAL RISK FACTORS: \par -no prior breast biopsies or surgeries \par -no family history of breast or ovarian cancer \par -, age at first live birth was 24 \par -current OCP use x 7 years\par -no gyn surgeries\par \par Her work up was as follows: \par 2020 -- b/l screening mammogram and L breast US --> BIRADS 3\par -heterogenously dense breasts\par -re-demonstration of unchanged L breast mass \par -no suspicious microcalcifications or architectural distortion in L \par -no suspicious mass, microcalcifications or architectural distortion in R \par L US \par -@11-12N2, 1.2 x 1.7 x 0.9 cm circumscribed hypoechoic mass, no change \par -previously described less distinct mass measuring 0.3 cm is not visualized on current exam\par \par \par 2021: L dx mmg/us --> BIRADS 3\par stable L breast mass @11-12N2, measuring 1.7 cm, stable left lateral breast asymmetry\par \par \par 2021 b/l mammo and US --> BIRADS 2\par -breasts are heterogeneously dense\par -Circumscribed masses and lateral asymmetry noted in the left breast have demonstrated long-term stability and are therefore benign in etiology.\par LEFT US:\par -@11 to 12N 2 stable hypoechoic mass measuring 1.2 x 1.7 x 0.8 cm, benign given its long-term stability.\par -adjacent stable hypoechoic mass measuring 0.4 x 0.4 x 0.2 cm, benign even its long-term stability.\par \par 1/3/22: B scg mmg/us --> BIRADS 0\par Dense\par MMG: architectural distortion right UOQ\par           stable mass and asymmetry in left breast\par US: R 12N2 stable 0.3 x 0.4 x0.2 cyst\par        L 11-12N2 stable 1.2 x 1.3 x 0.8cm mass\par           12N3 0.5 x 0.4 x 0.2cm benign cyst\par Recommend additional imaging \par \par 23: R mmg/us --> BIRADS 4\par Dense\par 9:00 axis, 3-4 cm from the nipple, there is an irregular hypoechoic antiparallel mass measuring 0.5 x 0.6 x 0.5 cm, corresponding to the location of architectural distortion on mammography --> REC BX\par 11:00 axis, 11-12 cm from the nipple, in the axilla, there is a prominent lymph node with asymmetric cortical thickening measuring 1.5 x 2.4 x 1.1 cm --> REC BX\par \par 23: USGB x 2, R\par R 9N3-4 mass : radial scar (stoplight)\par R node: meka fragments with dermatopathic lymphadenitis (tophat)\par \par Denies any current complaints.

## 2023-02-14 NOTE — PHYSICAL EXAM
[Normocephalic] : normocephalic [EOMI] : extra ocular movement intact [No Supraclavicular Adenopathy] : no supraclavicular adenopathy [No Cervical Adenopathy] : no cervical adenopathy [Examined in the supine and seated position] : examined in the supine and seated position [No dominant masses] : no dominant masses in right breast  [No dominant masses] : no dominant masses left breast [No Nipple Retraction] : no left nipple retraction [No Nipple Discharge] : no left nipple discharge [Breast Nipple Inversion] : nipples not inverted [Breast Nipple Retraction] : nipples not retracted [Breast Nipple Flattening] : nipples not flattened [Breast Nipple Fissures] : nipples not fissured [No Axillary Lymphadenopathy] : no left axillary lymphadenopathy [Soft] : abdomen soft [No Rashes] : no rashes [No Ulceration] : no ulceration [de-identified] : NL respiraitons

## 2023-04-05 ENCOUNTER — APPOINTMENT (OUTPATIENT)
Dept: BREAST CENTER | Facility: AMBULATORY SURGERY CENTER | Age: 43
End: 2023-04-05

## 2023-04-18 ENCOUNTER — APPOINTMENT (OUTPATIENT)
Dept: BREAST CENTER | Facility: CLINIC | Age: 43
End: 2023-04-18

## 2023-11-16 ENCOUNTER — NON-APPOINTMENT (OUTPATIENT)
Age: 43
End: 2023-11-16

## 2024-03-14 ENCOUNTER — EMERGENCY (EMERGENCY)
Facility: HOSPITAL | Age: 44
LOS: 0 days | Discharge: HOME | End: 2024-03-14
Attending: EMERGENCY MEDICINE
Payer: COMMERCIAL

## 2024-03-14 VITALS
DIASTOLIC BLOOD PRESSURE: 77 MMHG | SYSTOLIC BLOOD PRESSURE: 124 MMHG | OXYGEN SATURATION: 95 % | RESPIRATION RATE: 18 BRPM | TEMPERATURE: 101 F | HEART RATE: 127 BPM | WEIGHT: 190.04 LBS

## 2024-03-14 VITALS
RESPIRATION RATE: 18 BRPM | HEART RATE: 109 BPM | TEMPERATURE: 99 F | DIASTOLIC BLOOD PRESSURE: 60 MMHG | OXYGEN SATURATION: 100 % | SYSTOLIC BLOOD PRESSURE: 110 MMHG

## 2024-03-14 DIAGNOSIS — R06.02 SHORTNESS OF BREATH: ICD-10-CM

## 2024-03-14 DIAGNOSIS — J02.9 ACUTE PHARYNGITIS, UNSPECIFIED: ICD-10-CM

## 2024-03-14 DIAGNOSIS — Z87.891 PERSONAL HISTORY OF NICOTINE DEPENDENCE: ICD-10-CM

## 2024-03-14 DIAGNOSIS — Z20.822 CONTACT WITH AND (SUSPECTED) EXPOSURE TO COVID-19: ICD-10-CM

## 2024-03-14 DIAGNOSIS — J18.9 PNEUMONIA, UNSPECIFIED ORGANISM: ICD-10-CM

## 2024-03-14 DIAGNOSIS — R05.9 COUGH, UNSPECIFIED: ICD-10-CM

## 2024-03-14 LAB
ALBUMIN SERPL ELPH-MCNC: 4.3 G/DL — SIGNIFICANT CHANGE UP (ref 3.5–5.2)
ALP SERPL-CCNC: 144 U/L — HIGH (ref 30–115)
ALT FLD-CCNC: 122 U/L — HIGH (ref 0–41)
ANION GAP SERPL CALC-SCNC: 13 MMOL/L — SIGNIFICANT CHANGE UP (ref 7–14)
AST SERPL-CCNC: 62 U/L — HIGH (ref 0–41)
BASOPHILS # BLD AUTO: 0.03 K/UL — SIGNIFICANT CHANGE UP (ref 0–0.2)
BASOPHILS NFR BLD AUTO: 0.2 % — SIGNIFICANT CHANGE UP (ref 0–1)
BILIRUB SERPL-MCNC: 0.5 MG/DL — SIGNIFICANT CHANGE UP (ref 0.2–1.2)
BUN SERPL-MCNC: 10 MG/DL — SIGNIFICANT CHANGE UP (ref 10–20)
CALCIUM SERPL-MCNC: 9.1 MG/DL — SIGNIFICANT CHANGE UP (ref 8.4–10.4)
CHLORIDE SERPL-SCNC: 101 MMOL/L — SIGNIFICANT CHANGE UP (ref 98–110)
CO2 SERPL-SCNC: 23 MMOL/L — SIGNIFICANT CHANGE UP (ref 17–32)
CREAT SERPL-MCNC: 0.7 MG/DL — SIGNIFICANT CHANGE UP (ref 0.7–1.5)
EGFR: 109 ML/MIN/1.73M2 — SIGNIFICANT CHANGE UP
EOSINOPHIL # BLD AUTO: 0.14 K/UL — SIGNIFICANT CHANGE UP (ref 0–0.7)
EOSINOPHIL NFR BLD AUTO: 1.1 % — SIGNIFICANT CHANGE UP (ref 0–8)
FLUAV AG NPH QL: SIGNIFICANT CHANGE UP
FLUBV AG NPH QL: SIGNIFICANT CHANGE UP
GLUCOSE SERPL-MCNC: 123 MG/DL — HIGH (ref 70–99)
HCG SERPL QL: NEGATIVE — SIGNIFICANT CHANGE UP
HCT VFR BLD CALC: 33.4 % — LOW (ref 37–47)
HGB BLD-MCNC: 11.5 G/DL — LOW (ref 12–16)
IMM GRANULOCYTES NFR BLD AUTO: 0.4 % — HIGH (ref 0.1–0.3)
LACTATE SERPL-SCNC: 1.1 MMOL/L — SIGNIFICANT CHANGE UP (ref 0.7–2)
LYMPHOCYTES # BLD AUTO: 1.29 K/UL — SIGNIFICANT CHANGE UP (ref 1.2–3.4)
LYMPHOCYTES # BLD AUTO: 9.8 % — LOW (ref 20.5–51.1)
MCHC RBC-ENTMCNC: 30.7 PG — SIGNIFICANT CHANGE UP (ref 27–31)
MCHC RBC-ENTMCNC: 34.4 G/DL — SIGNIFICANT CHANGE UP (ref 32–37)
MCV RBC AUTO: 89.1 FL — SIGNIFICANT CHANGE UP (ref 81–99)
MONOCYTES # BLD AUTO: 0.84 K/UL — HIGH (ref 0.1–0.6)
MONOCYTES NFR BLD AUTO: 6.4 % — SIGNIFICANT CHANGE UP (ref 1.7–9.3)
NEUTROPHILS # BLD AUTO: 10.86 K/UL — HIGH (ref 1.4–6.5)
NEUTROPHILS NFR BLD AUTO: 82.1 % — HIGH (ref 42.2–75.2)
NRBC # BLD: 0 /100 WBCS — SIGNIFICANT CHANGE UP (ref 0–0)
NT-PROBNP SERPL-SCNC: <5 PG/ML — SIGNIFICANT CHANGE UP (ref 0–300)
PLATELET # BLD AUTO: 263 K/UL — SIGNIFICANT CHANGE UP (ref 130–400)
PMV BLD: 10.6 FL — HIGH (ref 7.4–10.4)
POTASSIUM SERPL-MCNC: 3.4 MMOL/L — LOW (ref 3.5–5)
POTASSIUM SERPL-SCNC: 3.4 MMOL/L — LOW (ref 3.5–5)
PROT SERPL-MCNC: 7.5 G/DL — SIGNIFICANT CHANGE UP (ref 6–8)
RBC # BLD: 3.75 M/UL — LOW (ref 4.2–5.4)
RBC # FLD: 12.2 % — SIGNIFICANT CHANGE UP (ref 11.5–14.5)
RSV RNA NPH QL NAA+NON-PROBE: SIGNIFICANT CHANGE UP
SARS-COV-2 RNA SPEC QL NAA+PROBE: SIGNIFICANT CHANGE UP
SODIUM SERPL-SCNC: 137 MMOL/L — SIGNIFICANT CHANGE UP (ref 135–146)
TROPONIN T, HIGH SENSITIVITY RESULT: 6 NG/L — SIGNIFICANT CHANGE UP (ref 6–13)
WBC # BLD: 13.21 K/UL — HIGH (ref 4.8–10.8)
WBC # FLD AUTO: 13.21 K/UL — HIGH (ref 4.8–10.8)

## 2024-03-14 PROCEDURE — 94640 AIRWAY INHALATION TREATMENT: CPT

## 2024-03-14 PROCEDURE — 71260 CT THORAX DX C+: CPT | Mod: MC

## 2024-03-14 PROCEDURE — 0241U: CPT

## 2024-03-14 PROCEDURE — 99285 EMERGENCY DEPT VISIT HI MDM: CPT

## 2024-03-14 PROCEDURE — 71046 X-RAY EXAM CHEST 2 VIEWS: CPT | Mod: 26

## 2024-03-14 PROCEDURE — 80053 COMPREHEN METABOLIC PANEL: CPT

## 2024-03-14 PROCEDURE — 84703 CHORIONIC GONADOTROPIN ASSAY: CPT

## 2024-03-14 PROCEDURE — 83605 ASSAY OF LACTIC ACID: CPT

## 2024-03-14 PROCEDURE — 85025 COMPLETE CBC W/AUTO DIFF WBC: CPT

## 2024-03-14 PROCEDURE — 84484 ASSAY OF TROPONIN QUANT: CPT

## 2024-03-14 PROCEDURE — 71046 X-RAY EXAM CHEST 2 VIEWS: CPT

## 2024-03-14 PROCEDURE — 99285 EMERGENCY DEPT VISIT HI MDM: CPT | Mod: 25

## 2024-03-14 PROCEDURE — 83880 ASSAY OF NATRIURETIC PEPTIDE: CPT

## 2024-03-14 PROCEDURE — 93005 ELECTROCARDIOGRAM TRACING: CPT

## 2024-03-14 PROCEDURE — 87040 BLOOD CULTURE FOR BACTERIA: CPT

## 2024-03-14 PROCEDURE — 93010 ELECTROCARDIOGRAM REPORT: CPT

## 2024-03-14 PROCEDURE — 36415 COLL VENOUS BLD VENIPUNCTURE: CPT

## 2024-03-14 PROCEDURE — 71260 CT THORAX DX C+: CPT | Mod: 26,MC

## 2024-03-14 RX ORDER — IPRATROPIUM/ALBUTEROL SULFATE 18-103MCG
3 AEROSOL WITH ADAPTER (GRAM) INHALATION ONCE
Refills: 0 | Status: COMPLETED | OUTPATIENT
Start: 2024-03-14 | End: 2024-03-14

## 2024-03-14 RX ORDER — ACETAMINOPHEN 500 MG
975 TABLET ORAL ONCE
Refills: 0 | Status: COMPLETED | OUTPATIENT
Start: 2024-03-14 | End: 2024-03-14

## 2024-03-14 RX ORDER — DEXAMETHASONE 0.5 MG/5ML
10 ELIXIR ORAL ONCE
Refills: 0 | Status: COMPLETED | OUTPATIENT
Start: 2024-03-14 | End: 2024-03-14

## 2024-03-14 RX ORDER — ALBUTEROL 90 UG/1
2 AEROSOL, METERED ORAL
Qty: 1 | Refills: 1
Start: 2024-03-14 | End: 2024-04-02

## 2024-03-14 RX ADMIN — Medication 3 MILLILITER(S): at 01:38

## 2024-03-14 RX ADMIN — Medication 3 MILLILITER(S): at 01:37

## 2024-03-14 RX ADMIN — Medication 975 MILLIGRAM(S): at 01:37

## 2024-03-14 RX ADMIN — Medication 10 MILLIGRAM(S): at 01:52

## 2024-03-14 NOTE — ED PROVIDER NOTE - NSFOLLOWUPINSTRUCTIONS_ED_ALL_ED_FT
Pneumonia    Pneumonia is an infection of the lungs. Pneumonia may be caused by bacteria, viruses, or funguses. Symptoms include coughing, fever, chest pain when breathing deeply or coughing, shortness of breath, fatigue, or muscle aches. Pneumonia can be diagnosed with a medical history and physical exam, as well as other tests which may include a chest X-ray. If you were prescribed an antibiotic medicine, take it as told by your health care provider and do not stop taking the antibiotic even if you start to feel better. Do not use tobacco products, including cigarettes, chewing tobacco, and e-cigarettes.    SEEK IMMEDIATE MEDICAL CARE IF YOU HAVE THE FOLLOWING SYMPTOMS: worsening shortness of breath, worsening chest pain, coughing up blood, change in mental status, lightheadedness/dizziness.        follow up with primary care doctor in 7 days

## 2024-03-14 NOTE — ED PROVIDER NOTE - PATIENT PORTAL LINK FT
You can access the FollowMyHealth Patient Portal offered by [facility] by registering at the following website: [email or web address]. By joining [facility]’s FollowMyHealth portal, you will also be able to view your health information using other applications (apps) compatible with our system.

## 2024-03-14 NOTE — ED PROVIDER NOTE - WHICH SHOWED
Independent interpretation of the CT scan as a preliminary reading by Dr. Kilo Clarke shows Independent interpretation of the CT scan as a preliminary reading by Dr. Kilo Clarke shows lower lobe infiltrate

## 2024-03-14 NOTE — ED PROVIDER NOTE - OBJECTIVE STATEMENT
43 year old female, no past medical history, who presents with flu like symptoms. patient with uri symptoms that began x2 weeks ago, started on augmentin and eye drops with moderate improvement of conjunctivitis. patient reports persistent cough and shortness of breath, describes cough as productive with green tinged sputum. +chills, +nasal congestion, +sore throat. denies chest pain, hemoptysis, abd pain, vomiting/diarrhea. no recent travel, +works in .

## 2024-03-14 NOTE — ED ADULT TRIAGE NOTE - CHIEF COMPLAINT QUOTE
pt complains of shortness of breath, body aches, fatigue, since today, pt recently had infection of sinusitis and URI and took augmentin for 10 days

## 2024-03-14 NOTE — ED PROVIDER NOTE - PHYSICAL EXAMINATION
CONSTITUTIONAL: non-toxic appearing female  SKIN: skin exam is warm and dry  EYES: PERRL, EOM intact; conjunctiva and sclera clear.  ENT: +nasal congestion, +erythematous oropharynx, no exudates  CARD: S1, S2 normal, no murmur  RESP: expiratory wheezing. tachypneic, speaking full sentences, no accessory muscle use.   ABD: soft; non-distended; non-tender.   EXT: Normal ROM.    NEURO: awake, alert, following commands, oriented, grossly unremarkable. No Focal deficits. GCS 15.   PSYCH: Cooperative, appropriate.

## 2024-03-14 NOTE — ED PROVIDER NOTE - ATTENDING APP SHARED VISIT CONTRIBUTION OF CARE
Patient presents with cough for the past 4 days associated with fever today and shortness of breath.  There is no pleuritic pain.  There is no leg swelling.  2 weeks ago had conjunctivitis which was treated with antibiotics.  States that her shortness of breath is associated with coughing.  Minimal exertion and talking can exacerbate her cough.  Becomes as what she describes coughing fits.  On exam there is some wheezing faint and expiratory bilaterally.  Plan is to obtain labs x-ray nebs and steroids

## 2024-03-19 LAB
CULTURE RESULTS: SIGNIFICANT CHANGE UP
SPECIMEN SOURCE: SIGNIFICANT CHANGE UP

## 2024-11-06 ENCOUNTER — NON-APPOINTMENT (OUTPATIENT)
Age: 44
End: 2024-11-06

## 2025-04-11 ENCOUNTER — NON-APPOINTMENT (OUTPATIENT)
Age: 45
End: 2025-04-11

## 2025-07-08 ENCOUNTER — NON-APPOINTMENT (OUTPATIENT)
Age: 45
End: 2025-07-08